# Patient Record
Sex: MALE | Race: WHITE
[De-identification: names, ages, dates, MRNs, and addresses within clinical notes are randomized per-mention and may not be internally consistent; named-entity substitution may affect disease eponyms.]

---

## 2019-09-19 ENCOUNTER — HOSPITAL ENCOUNTER (OUTPATIENT)
Dept: HOSPITAL 92 - ERS | Age: 29
Setting detail: OBSERVATION
LOS: 1 days | Discharge: HOME | End: 2019-09-20
Attending: SURGERY | Admitting: SURGERY
Payer: COMMERCIAL

## 2019-09-19 VITALS — BODY MASS INDEX: 27.8 KG/M2

## 2019-09-19 DIAGNOSIS — I10: ICD-10-CM

## 2019-09-19 DIAGNOSIS — F17.290: ICD-10-CM

## 2019-09-19 DIAGNOSIS — Z79.899: ICD-10-CM

## 2019-09-19 DIAGNOSIS — K35.80: Primary | ICD-10-CM

## 2019-09-19 LAB
ALBUMIN SERPL BCG-MCNC: 4.9 G/DL (ref 3.5–5)
ALP SERPL-CCNC: 102 U/L (ref 40–150)
ALT SERPL W P-5'-P-CCNC: 22 U/L (ref 8–55)
ANION GAP SERPL CALC-SCNC: 12 MMOL/L (ref 10–20)
AST SERPL-CCNC: 24 U/L (ref 5–34)
BILIRUB SERPL-MCNC: 1.1 MG/DL (ref 0.2–1.2)
BUN SERPL-MCNC: 8 MG/DL (ref 8.9–20.6)
CALCIUM SERPL-MCNC: 9.7 MG/DL (ref 7.8–10.44)
CHLORIDE SERPL-SCNC: 100 MMOL/L (ref 98–107)
CO2 SERPL-SCNC: 25 MMOL/L (ref 22–29)
CREAT CL PREDICTED SERPL C-G-VRATE: 0 ML/MIN (ref 70–130)
GLOBULIN SER CALC-MCNC: 2.9 G/DL (ref 2.4–3.5)
GLUCOSE SERPL-MCNC: 74 MG/DL (ref 70–105)
HGB BLD-MCNC: 13.3 G/DL (ref 14–18)
LIPASE SERPL-CCNC: 20 U/L (ref 8–78)
MCH RBC QN AUTO: 31.4 PG (ref 27–31)
MCV RBC AUTO: 91.2 FL (ref 78–98)
MDIFF COMPLETE?: YES
PLATELET # BLD AUTO: 271 THOU/UL (ref 130–400)
POTASSIUM SERPL-SCNC: 3.8 MMOL/L (ref 3.5–5.1)
RBC # BLD AUTO: 4.22 MILL/UL (ref 4.7–6.1)
SODIUM SERPL-SCNC: 133 MMOL/L (ref 136–145)
WBC # BLD AUTO: 19.3 THOU/UL (ref 4.8–10.8)

## 2019-09-19 PROCEDURE — 96375 TX/PRO/DX INJ NEW DRUG ADDON: CPT

## 2019-09-19 PROCEDURE — 88304 TISSUE EXAM BY PATHOLOGIST: CPT

## 2019-09-19 PROCEDURE — 80053 COMPREHEN METABOLIC PANEL: CPT

## 2019-09-19 PROCEDURE — 74177 CT ABD & PELVIS W/CONTRAST: CPT

## 2019-09-19 PROCEDURE — 96365 THER/PROPH/DIAG IV INF INIT: CPT

## 2019-09-19 PROCEDURE — 96376 TX/PRO/DX INJ SAME DRUG ADON: CPT

## 2019-09-19 PROCEDURE — 76870 US EXAM SCROTUM: CPT

## 2019-09-19 PROCEDURE — 93976 VASCULAR STUDY: CPT

## 2019-09-19 PROCEDURE — 83690 ASSAY OF LIPASE: CPT

## 2019-09-19 PROCEDURE — 85025 COMPLETE CBC W/AUTO DIFF WBC: CPT

## 2019-09-19 PROCEDURE — G0378 HOSPITAL OBSERVATION PER HR: HCPCS

## 2019-09-19 PROCEDURE — 96361 HYDRATE IV INFUSION ADD-ON: CPT

## 2019-09-19 NOTE — ULT
EXAM: Testicular/scrotal ultrasound



HISTORY: Right testicular pain and abdominal pain



COMPARISON: None



TECHNIQUE: Multiplanar grayscale and color Doppler images were obtained in a testicular/scrotal ultra
sound. Spectral analysis of the Doppler waveforms of the testicles were performed.



FINDINGS:

Right testicle: Normal in echogenicity. No focal mass.  Normal internal flow.

Left testicle: Normal in echogenicity. No focal mass.  Normal internal flow.



Right epididymis.  3 mm epididymal cyst.  Normal internal flow.

Left epididymis.  4 mm epididymal cyst.  Normal internal flow.



No  hydrocele is present.

No   varicocele is present.



IMPRESSION: Tiny bilateral epididymal cysts; otherwise unremarkable exam.



Reported By: Stevan Brennan 

Electronically Signed:  9/19/2019 8:00 PM

## 2019-09-19 NOTE — CT
CT Abdomen Pelvis W Con: 9/19/2019 7:17 PM



CLINICAL INFORMATION: Lower abdominal pain



COMPARISON: None.



TECHNIQUE: 

Multiple contiguous axial images were obtained and a CT of the abdomen and pelvis with IV contrast. C
oronal and sagittal reformats were performed.



FINDINGS:



Lower Chest: within normal limits.



Abdomen:

Liver: within normal limits.

Bile Ducts: Normal caliber.

Gallbladder: No calcified gallstones. Normal caliber wall.

Pancreas: within normal limits.

Spleen: within normal limits.

Adrenals: within normal limits.

Kidneys: within normal limits.



Pelvis:

Reproductive Organs: No pelvic masses.

Ureters: within normal limits.

Bladder: within normal limits.



Peritoneum: A trace amount of free fluid is seen in the pelvis. No free air is seen.

Bowel: Normal caliber. The appendix is enlarged measuring 8 mm. The appendix is retrocecal in locatio
n. Questionable stranding changes are seen adjacent to the appendix.

Mesentery and Retroperitoneum: No enlarged mesenteric or retroperitoneal lymph nodes.

Vessels: Normal. 

Abdominal Wall: within normal limits.

Bones: Within normal limits  



IMPRESSION:



Mild enlargement of the appendix could be secondary to early acute appendicitis. Correlate with point
 tenderness in the right lower quadrant of the abdomen.



Reported By: Stevan Brennan 

Electronically Signed:  9/19/2019 7:37 PM

## 2019-09-20 VITALS — DIASTOLIC BLOOD PRESSURE: 71 MMHG | TEMPERATURE: 98.1 F | SYSTOLIC BLOOD PRESSURE: 115 MMHG

## 2019-09-20 PROCEDURE — 0DTJ4ZZ RESECTION OF APPENDIX, PERCUTANEOUS ENDOSCOPIC APPROACH: ICD-10-PCS | Performed by: SURGERY

## 2019-09-20 NOTE — OP
DATE OF PROCEDURE:  09/20/2019



PREOPERATIVE DIAGNOSIS:  Acute appendicitis.



PROCEDURE PERFORMED:  Laparoscopic appendectomy.



INDICATIONS:  A 29-year-old male, with a 24-hour history of right lower quadrant

pain associated with nausea, leukocytosis and a CT scan showing appendicitis. 



FINDINGS:  Acute gangrenous appendicitis.



DESCRIPTION OF PROCEDURE:  After informed consent was obtained, the patient was

taken to the operating room and given general endotracheal anesthesia and placed in

the supine position.  Abdomen was prepped and draped in usual fashion.  Local

anesthesia was infiltrated subcutaneously and deep and a subumbilical incision was

performed.  Subcu divided sharply.  The fascia was grasped with 2 stay sutures of 0

Vicryl placed in each side of midline.  Midline incised.  Digital palpation revealed

no local adhesions.  A blunt 12-mm trocar inserted.  Pneumoperitoneum was created to

a pressure of 15 mmHg.  A 0-degree laparoscope inserted under direct vision, two

5-mm ports were placed, one suprapubic, one right lateral abdomen.  The appendix was

found.  The mesoappendix was divided with the LigaSure.  The base of the appendix

divided with a linear 45 mm white load stapler.  The appendix was placed in an

endosac and removed from the abdomen in the endosac.  Hemostasis was assured.  The

abdomen was irrigated.  Irrigation fluid removed.  Trocars and retractors were

removed.  The fascia was closed with interrupted 0 Vicryl suture.  The skin was

closed with interrupted 4-0 Rapide.  Dermabond applied.  The patient tolerated the

procedure well, transferred to Recovery in good condition.  Sponge and needle count

verified correct x2. 







Job ID:  [id number]

## 2019-09-20 NOTE — HP
CHIEF COMPLAINT:  Right lower quadrant abdominal pain.



HISTORY OF PRESENT ILLNESS:  The patient is a 29-year-old male, with a 24-hour

history of mid abdominal pain which is now localized to the right lower quadrant

associated with nausea, no vomiting, some subjective fevers. 



PAST MEDICAL HISTORY:  Otherwise healthy.



PAST SURGERIES:  Right inguinal hernia repair 2 years ago.



MEDICATIONS:  Vyvanse and lisinopril.



ALLERGIES:  HE HAS NO KNOWN DRUG ALLERGIES.



SOCIAL HISTORY:  He works for [employer].  He vapes an occasional cigarette.

Occasional alcohol. 



FAMILY HISTORY:  Diverticular disease and appendicitis.



PHYSICAL EXAMINATION:

VITAL SIGNS:  Temperature 98.1, pulse 57, blood pressure 107/54. 

GENERAL:  Healthy-appearing male, in minimal distress. 

HEENT:  Unremarkable. 

LUNGS:  Clear. 

HEART:  Regular rate and rhythm. 

ABDOMEN:  Soft.  Percussion tender in the right lower quadrant.



LABORATORY DATA:  White count 19,000, H and H are 13 and 38, platelet count 271.

Electrolytes are fine.  CT scan shows acute appendicitis. 



ASSESSMENT:  Acute appendicitis.



PLAN:  Laparoscopic appendectomy.



CONSENT:  I have discussed the planned procedure as well as risk of bleeding,

infection, injury to bowel, bladder, need to open.  He understands and gives

informed consent. 







Job ID:  [id number]

## 2019-09-21 NOTE — DIS
DATE OF ADMISSION:  09/19/2019



DATE OF DISCHARGE:  09/20/2019



DISCHARGE DIAGNOSIS:  Acute gangrenous appendicitis.



PROCEDURE DURING ADMISSION:  Laparoscopic appendectomy.



HOSPITAL COURSE:  The patient was admitted, taken to the operating room, where he

underwent laparoscopic appendectomy.  He was found to have a gangrenous appendix.

He is doing well.  He is tolerating liquids well.  Pain is controlled on p.o.

medications.  He is discharged home on hydrocodone, Zofran, and doxycycline.  He

will follow up with me in 2 weeks. 







Job ID:  [id number]